# Patient Record
Sex: FEMALE | Employment: UNEMPLOYED | URBAN - METROPOLITAN AREA
[De-identification: names, ages, dates, MRNs, and addresses within clinical notes are randomized per-mention and may not be internally consistent; named-entity substitution may affect disease eponyms.]

---

## 2019-06-10 ENCOUNTER — OFFICE VISIT (OUTPATIENT)
Dept: SURGERY | Facility: CLINIC | Age: 39
End: 2019-06-10
Payer: MEDICAID

## 2019-06-10 VITALS — WEIGHT: 164.81 LBS | BODY MASS INDEX: 23.07 KG/M2 | HEIGHT: 71 IN

## 2019-06-10 DIAGNOSIS — R92.8 ABNORMAL MAMMOGRAM: ICD-10-CM

## 2019-06-10 DIAGNOSIS — N61.1 LEFT BREAST ABSCESS: Primary | ICD-10-CM

## 2019-06-10 PROCEDURE — 99203 PR OFFICE/OUTPT VISIT, NEW, LEVL III, 30-44 MIN: ICD-10-PCS | Mod: S$PBB,25,, | Performed by: SURGERY

## 2019-06-10 PROCEDURE — 76942 PR U/S GUIDANCE FOR NEEDLE GUIDANCE: ICD-10-PCS | Mod: 26,S$GLB,, | Performed by: SURGERY

## 2019-06-10 PROCEDURE — 99203 OFFICE O/P NEW LOW 30 MIN: CPT | Mod: S$PBB,25,, | Performed by: SURGERY

## 2019-06-10 PROCEDURE — 76942 ECHO GUIDE FOR BIOPSY: CPT | Mod: 26,S$GLB,, | Performed by: SURGERY

## 2019-06-10 PROCEDURE — 87075 CULTR BACTERIA EXCEPT BLOOD: CPT

## 2019-06-10 PROCEDURE — 99999 PR PBB SHADOW E&M-EST. PATIENT-LVL II: CPT | Mod: PBBFAC,,, | Performed by: SURGERY

## 2019-06-10 PROCEDURE — 87077 CULTURE AEROBIC IDENTIFY: CPT

## 2019-06-10 PROCEDURE — 87070 CULTURE OTHR SPECIMN AEROBIC: CPT

## 2019-06-10 PROCEDURE — 10061 I&D ABSCESS COMP/MULTIPLE: CPT | Mod: PBBFAC,PO | Performed by: SURGERY

## 2019-06-10 PROCEDURE — 10160 PR PUNCTURE DRAINAGE, LESION: ICD-10-PCS | Mod: S$PBB,,, | Performed by: SURGERY

## 2019-06-10 PROCEDURE — 99212 OFFICE O/P EST SF 10 MIN: CPT | Mod: PBBFAC,PO | Performed by: SURGERY

## 2019-06-10 PROCEDURE — 87185 SC STD ENZYME DETCJ PER NZM: CPT

## 2019-06-10 PROCEDURE — 99999 PR PBB SHADOW E&M-EST. PATIENT-LVL II: ICD-10-PCS | Mod: PBBFAC,,, | Performed by: SURGERY

## 2019-06-10 PROCEDURE — 10160 PNXR ASPIR ABSC HMTMA BULLA: CPT | Mod: S$PBB,,, | Performed by: SURGERY

## 2019-06-10 RX ORDER — SULFAMETHOXAZOLE AND TRIMETHOPRIM 800; 160 MG/1; MG/1
1 TABLET ORAL 2 TIMES DAILY
Qty: 20 TABLET | Refills: 0 | Status: SHIPPED | OUTPATIENT
Start: 2019-06-10 | End: 2019-06-20

## 2019-06-10 NOTE — LETTER
Amisha 10, 2019      Erica Lezama MD  [address]  [facility]  [place] [address]           [facility] - Breast Surgery  [address] [facility] [address]  [place] [address]  Phone: [phone]  Fax: [phone]          Patient: Ronnie Kuhn   MR Number: [id number]   YOB: 1980   Date of Visit: 6/10/2019       Dear Dr. Erica Lezama:    Thank you for referring Ronnie Kuhn to me for evaluation. Attached you will find relevant portions of my assessment and plan of care.    If you have questions, please do not hesitate to call me. I look forward to following Ronnie Kuhn along with you.    Sincerely,    Jacqueline Law MD      Enclosure  CC:  No Recipients    If you would like to receive this communication electronically, please contact [email or web address] or [phone] to request more information on [facility] access.    For providers and/or their staff who would like to refer a patient to [facility], please contact us through our one-stop-shop provider referral line, [facility], at [phone].    If you feel you have received this communication in error or would no longer like to receive these types of communications, please e-mail [email or web address]

## 2019-06-10 NOTE — PROGRESS NOTES
Breast Surgery  [facility]  Department of Surgery      REFERRING PROVIDER: Erica Lezama MD  [address] [place] [address]    Chief Complaint: Consult (abn mammo recommending bx)      Subjective:      Patient ID: Ronnie Kuhn is a 38 y.o. female who presents with abnormal mammogram, suspicious today for breast abscess.  Biopsy was recommended for BIRADS IV mammogram.  She reports having a nipple piercing about 1 year ago and had several incidences of trauma to the nipple.  She reports then feeling a mass of the lower areola about 3 weeks ago and removed the piercing.  A mammogram was performed, which initiated discharge that was thick and white from the piercing tract on the nipple.  SHe has discharge daily since the mammogram.  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      GYN History:  Age of menarche was 15. Premenopausal with regular periods and no OCPs..  Patient is . Age of first live birth was 25. Patient did breast feed.    History reviewed. No pertinent past medical history.  History reviewed. No pertinent surgical history.  No current outpatient medications on file prior to visit.     No current facility-administered medications on file prior to visit.      Social History     Socioeconomic History    Marital status: Single     Spouse name: Not on file    Number of children: Not on file    Years of education: Not on file    Highest education level: Not on file   Occupational History    Not on file   Social Needs    Financial resource strain: Not on file    Food insecurity:     Worry: Not on file     Inability: Not on file    Transportation needs:     Medical: Not on file     Non-medical: Not on file   Tobacco Use    Smoking status: Current Some Day Smoker     Types: Cigarettes   Substance and Sexual Activity    Alcohol use: Yes     Comment: Multiple times per week    Drug use: Yes  "    Types: Marijuana     Comment: daily    Sexual activity: Not on file   Lifestyle    Physical activity:     Days per week: Not on file     Minutes per session: Not on file    Stress: Not on file   Relationships    Social connections:     Talks on phone: Not on file     Gets together: Not on file     Attends Alevism service: Not on file     Active member of club or organization: Not on file     Attends meetings of clubs or organizations: Not on file     Relationship status: Not on file   Other Topics Concern    Not on file   Social History Narrative    Not on file     Family History   Problem Relation Age of Onset    Cancer Maternal Aunt         leukemia        Review of Systems   Constitutional: Negative for appetite change, chills, fever and unexpected weight change.   HENT: Negative for facial swelling, postnasal drip and sore throat.    Eyes: Negative for redness and itching.   Respiratory: Negative for chest tightness and shortness of breath.    Cardiovascular: Negative for chest pain and palpitations.   Gastrointestinal: Negative for blood in stool, diarrhea, nausea and vomiting.   Genitourinary: Negative for difficulty urinating and dysuria.   Musculoskeletal: Negative for arthralgias and joint swelling.   Skin: Negative for rash and wound.   Neurological: Negative for dizziness and syncope.   Hematological: Negative for adenopathy.   Psychiatric/Behavioral: Negative for agitation. The patient is not nervous/anxious.      Objective:   Ht 5' 11" (1.803 m)   Wt 74.7 kg (164 lb 12.7 oz)   BMI 22.98 kg/m²     Physical Exam   Constitutional: She appears well-developed and well-nourished.   HENT:   Head: Normocephalic.   Eyes: No scleral icterus.   Neck: Neck supple. No tracheal deviation present.   Cardiovascular: Normal rate and regular rhythm.    Pulmonary/Chest: Breath sounds normal. No respiratory distress. Right breast exhibits no inverted nipple, no mass, no nipple discharge and no skin change. " Left breast exhibits skin change. Left breast exhibits no inverted nipple, no mass and no nipple discharge.       Abdominal: Soft. She exhibits no mass. There is no tenderness.   Musculoskeletal: She exhibits no edema.   Lymphadenopathy:     She has no cervical adenopathy.   Neurological: She is alert.   Skin: No rash noted. No erythema.     Psychiatric: She has a normal mood and affect.       Radiology review: Images personally reviewed by me in the clinic.   Mammogram:Findings:  This procedure was performed using tomosynthesis.  Computer-aided detection was utilized in the interpretation of this examination.  The breasts are almost entirely fatty.      Left  Mammo Digital Diagnostic Bilat w/ Terry  There is a 30 mm mass seen in the retroareolar region of the left breast.      US Breast Left Limited  There is a 30 mm mass seen in the retroareolar region of the left breast.      Right  Mammo Digital Diagnostic Bilat w/ Terry  There is no evidence of suspicious masses, calcifications, or other abnormal findings.     Impression:  Left  Mass: Left breast 30 mm mass at the retroareolar position. Assessment: 4 - Suspicious finding. Biopsy is recommended.      Right  There is no mammographic or sonographic evidence of malignancy.     BI-RADS Category:   Overall: 4 - Suspicious     Recommendation:  Biopsy is recommended.     The patient's estimated lifetime risk of breast cancer (to age 85) based on Tyrer-Cuzick - 7 risk assessment model is: Tyrer-Cuzick: 12.02 %. According to the American Cancer Society,  patients with a lifetime breast cancer risk of 20% or higher might benefit from supplemental screening tests.    Assessment:       1. Left breast abscess    2. Abnormal mammogram        Plan:       We discussed typical protocol for biopsy to be performed following abnormal mammogram, but on exam, the suspected mass seemed more like an abscess.  US was performed and identified a fluid collection in the inferior subareolar  space at the dermis.  We then elected to perform an aspiration of the fluid and culture.  Antibiotics were also recommended.  10 Day course of Bactrim DS sent to pharmacy.    I explained that even if this fully resolves, I would recommend a follow up MMG in 6-8weeks to assure complete resolution with no residual mass as the etiology of the abscess.    I did tell her that she may still need a biopsy but that we will re-evaluate her in 2 weeks to see if there is full resolution.  I will reach out to radiology to discuss.   All her questions were answered.    Total time spent with the patient: 30 minutes.  20 minutes of face to face consultation and 10 minutes of chart review and coordination of care.    PROCEDURE:  Ultrasound performed to identify fluid collection.  Alcohol prep used.  1% lidocaine injected.  18g needle used to aspirate 4cc bloody purulent thick fluid.  Most of the cavity collapsed on US.  Pressure dressing placed.  Fluid sent for culture.

## 2019-06-12 LAB — BACTERIA SPEC AEROBE CULT: NORMAL

## 2019-06-15 LAB — BACTERIA SPEC ANAEROBE CULT: NORMAL

## 2019-06-24 ENCOUNTER — OFFICE VISIT (OUTPATIENT)
Dept: SURGERY | Facility: CLINIC | Age: 39
End: 2019-06-24
Payer: MEDICAID

## 2019-06-24 VITALS — SYSTOLIC BLOOD PRESSURE: 102 MMHG | DIASTOLIC BLOOD PRESSURE: 78 MMHG

## 2019-06-24 DIAGNOSIS — N61.1 LEFT BREAST ABSCESS: Primary | ICD-10-CM

## 2019-06-24 PROCEDURE — 99212 OFFICE O/P EST SF 10 MIN: CPT | Mod: PBBFAC,PO | Performed by: SURGERY

## 2019-06-24 PROCEDURE — 99212 OFFICE O/P EST SF 10 MIN: CPT | Mod: S$PBB,,, | Performed by: SURGERY

## 2019-06-24 PROCEDURE — 99212 PR OFFICE/OUTPT VISIT, EST, LEVL II, 10-19 MIN: ICD-10-PCS | Mod: S$PBB,,, | Performed by: SURGERY

## 2019-06-24 PROCEDURE — 99999 PR PBB SHADOW E&M-EST. PATIENT-LVL II: CPT | Mod: PBBFAC,,, | Performed by: SURGERY

## 2019-06-24 PROCEDURE — 99999 PR PBB SHADOW E&M-EST. PATIENT-LVL II: ICD-10-PCS | Mod: PBBFAC,,, | Performed by: SURGERY

## 2019-06-24 NOTE — PROGRESS NOTES
Breast Surgery  [facility]  Department of Surgery      REFERRING PROVIDER: No referring provider defined for this encounter.    Chief Complaint: Follow-up (breast abscess)      Subjective:      Patient ID: Ronnie Kuhn is a 38 y.o. female who presents with abnormal mammogram, suspicious today for breast abscess.  Biopsy was recommended for BIRADS IV mammogram.  She reports having a nipple piercing about 1 year ago and had several incidences of trauma to the nipple.  She reports then feeling a mass of the lower areola about 3 weeks ago and removed the piercing.  A mammogram was performed, which initiated discharge that was thick and white from the piercing tract on the nipple.  SHe has discharge daily since the mammogram.  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      GYN History:  Age of menarche was 15. Premenopausal with regular periods and no OCPs..  Patient is . Age of first live birth was 25. Patient did breast feed.    History reviewed. No pertinent past medical history.  History reviewed. No pertinent surgical history.  No current outpatient medications on file prior to visit.     No current facility-administered medications on file prior to visit.      Social History     Socioeconomic History    Marital status: Single     Spouse name: Not on file    Number of children: Not on file    Years of education: Not on file    Highest education level: Not on file   Occupational History    Not on file   Social Needs    Financial resource strain: Not on file    Food insecurity:     Worry: Not on file     Inability: Not on file    Transportation needs:     Medical: Not on file     Non-medical: Not on file   Tobacco Use    Smoking status: Current Some Day Smoker     Types: Cigarettes   Substance and Sexual Activity    Alcohol use: Yes     Comment: Multiple times per week    Drug use: Yes     Types: Marijuana     Comment: daily    Sexual  activity: Not on file   Lifestyle    Physical activity:     Days per week: Not on file     Minutes per session: Not on file    Stress: Not on file   Relationships    Social connections:     Talks on phone: Not on file     Gets together: Not on file     Attends [employer] service: Not on file     Active member of club or organization: Not on file     Attends meetings of clubs or organizations: Not on file     Relationship status: Not on file   Other Topics Concern    Not on file   Social History Narrative    Not on file     Family History   Problem Relation Age of Onset    Cancer Maternal Aunt         leukemia        Review of Systems   Constitutional: Negative for appetite change, chills, fever and unexpected weight change.   HENT: Negative for facial swelling, postnasal drip and sore throat.    Eyes: Negative for redness and itching.   Respiratory: Negative for chest tightness and shortness of breath.    Cardiovascular: Negative for chest pain and palpitations.   Gastrointestinal: Negative for blood in stool, diarrhea, nausea and vomiting.   Genitourinary: Negative for difficulty urinating and dysuria.   Musculoskeletal: Negative for arthralgias and joint swelling.   Skin: Negative for rash and wound.   Neurological: Negative for dizziness and syncope.   Hematological: Negative for adenopathy.   Psychiatric/Behavioral: Negative for agitation. The patient is not nervous/anxious.      Objective:   /78 (BP Location: Right arm, Patient Position: Sitting, BP Method: Large (Manual))     Physical Exam   Constitutional: She appears well-developed and well-nourished.   HENT:   Head: Normocephalic.   Eyes: No scleral icterus.   Neck: Neck supple. No tracheal deviation present.   Cardiovascular: Normal rate and regular rhythm.    Pulmonary/Chest: Breath sounds normal. No respiratory distress. Right breast exhibits no inverted nipple, no mass, no nipple discharge and no skin change. Left breast exhibits skin change.  Left breast exhibits no inverted nipple, no mass and no nipple discharge.       Abdominal: Soft. She exhibits no mass. There is no tenderness.   Musculoskeletal: She exhibits no edema.   Lymphadenopathy:     She has no cervical adenopathy.   Neurological: She is alert.   Skin: No rash noted. No erythema.     Psychiatric: She has a normal mood and affect.       Radiology review: Images personally reviewed by me in the clinic.   Mammogram:Findings:  This procedure was performed using tomosynthesis.  Computer-aided detection was utilized in the interpretation of this examination.  The breasts are almost entirely fatty.      Left  Mammo Digital Diagnostic Bilat w/ Terry  There is a 30 mm mass seen in the retroareolar region of the left breast.      US Breast Left Limited  There is a 30 mm mass seen in the retroareolar region of the left breast.      Right  Mammo Digital Diagnostic Bilat w/ Terry  There is no evidence of suspicious masses, calcifications, or other abnormal findings.     Impression:  Left  Mass: Left breast 30 mm mass at the retroareolar position. Assessment: 4 - Suspicious finding. Biopsy is recommended.      Right  There is no mammographic or sonographic evidence of malignancy.     BI-RADS Category:   Overall: 4 - Suspicious     Recommendation:  Biopsy is recommended.     The patient's estimated lifetime risk of breast cancer (to age 85) based on Tyrer-Cuzick - 7 risk assessment model is: Tyrer-Cuzick: 12.02 %. According to the American Cancer Society,  patients with a lifetime breast cancer risk of 20% or higher might benefit from supplemental screening tests.    Assessment:       Breast abscess   Plan:     She is much improved, almost completely resolved.  Will have her return in 1 month with mammogram to assess for complete resolution vs need for biopsy.

## 2019-07-01 DIAGNOSIS — N61.1 LEFT BREAST ABSCESS: Primary | ICD-10-CM

## 2019-07-02 DIAGNOSIS — N61.1 ABSCESS OF LEFT BREAST: Primary | ICD-10-CM

## 2019-07-22 ENCOUNTER — HOSPITAL ENCOUNTER (OUTPATIENT)
Dept: RADIOLOGY | Facility: HOSPITAL | Age: 39
Discharge: HOME | End: 2019-07-22
Attending: SURGERY
Payer: MEDICAID

## 2019-07-22 ENCOUNTER — OFFICE VISIT (OUTPATIENT)
Dept: SURGERY | Facility: CLINIC | Age: 39
End: 2019-07-22
Payer: MEDICAID

## 2019-07-22 VITALS — DIASTOLIC BLOOD PRESSURE: 87 MMHG | SYSTOLIC BLOOD PRESSURE: 134 MMHG | HEART RATE: 71 BPM

## 2019-07-22 DIAGNOSIS — N61.1 ABSCESS OF LEFT BREAST: ICD-10-CM

## 2019-07-22 DIAGNOSIS — N61.1 BREAST ABSCESS: Primary | ICD-10-CM

## 2019-07-22 DIAGNOSIS — N61.1 LEFT BREAST ABSCESS: ICD-10-CM

## 2019-07-22 PROCEDURE — 77061 BREAST TOMOSYNTHESIS UNI: CPT | Mod: 26,LT,, | Performed by: RADIOLOGY

## 2019-07-22 PROCEDURE — 76642 US BREAST LEFT LIMITED: ICD-10-PCS | Mod: 26,LT,, | Performed by: RADIOLOGY

## 2019-07-22 PROCEDURE — 77065 MAMMO DIGITAL DIAGNOSTIC LEFT WITH TOMOSYNTHESIS_CAD: ICD-10-PCS | Mod: 26,LT,, | Performed by: RADIOLOGY

## 2019-07-22 PROCEDURE — 99212 PR OFFICE/OUTPT VISIT, EST, LEVL II, 10-19 MIN: ICD-10-PCS | Mod: S$PBB,,, | Performed by: SURGERY

## 2019-07-22 PROCEDURE — 99212 OFFICE O/P EST SF 10 MIN: CPT | Mod: PBBFAC,25,PO | Performed by: SURGERY

## 2019-07-22 PROCEDURE — 77065 DX MAMMO INCL CAD UNI: CPT | Mod: TC,LT

## 2019-07-22 PROCEDURE — 76642 ULTRASOUND BREAST LIMITED: CPT | Mod: TC,LT

## 2019-07-22 PROCEDURE — 76642 ULTRASOUND BREAST LIMITED: CPT | Mod: 26,LT,, | Performed by: RADIOLOGY

## 2019-07-22 PROCEDURE — 77061 MAMMO DIGITAL DIAGNOSTIC LEFT WITH TOMOSYNTHESIS_CAD: ICD-10-PCS | Mod: 26,LT,, | Performed by: RADIOLOGY

## 2019-07-22 PROCEDURE — 77065 DX MAMMO INCL CAD UNI: CPT | Mod: 26,LT,, | Performed by: RADIOLOGY

## 2019-07-22 PROCEDURE — 99999 PR PBB SHADOW E&M-EST. PATIENT-LVL II: ICD-10-PCS | Mod: PBBFAC,,, | Performed by: SURGERY

## 2019-07-22 PROCEDURE — 99212 OFFICE O/P EST SF 10 MIN: CPT | Mod: S$PBB,,, | Performed by: SURGERY

## 2019-07-22 PROCEDURE — 77061 BREAST TOMOSYNTHESIS UNI: CPT | Mod: TC,LT

## 2019-07-22 PROCEDURE — 99999 PR PBB SHADOW E&M-EST. PATIENT-LVL II: CPT | Mod: PBBFAC,,, | Performed by: SURGERY

## 2019-07-22 NOTE — PROGRESS NOTES
Breast Surgery  [facility]  Department of Surgery      REFERRING PROVIDER: No referring provider defined for this encounter.    Chief Complaint: breast abscess    Subjective:      Patient ID: Ronnie Kuhn is a 38 y.o. female who returns for follow up of abscess with imaging.  There has not been complete resolution but near complete.  No longer painful.  Had a recent episode of a very scant amount of clear discharge. Her imaging today shows improvement and recommends return to annual mammogram schedule at age 42.    She initially presented with abnormal mammogram, suspicious today for breast abscess.  Biopsy was recommended for BIRADS IV mammogram.  She reports having a nipple piercing about 1 year ago and had several incidences of trauma to the nipple.  She reports then feeling a mass of the lower areola about 3 weeks ago and removed the piercing.  A mammogram was performed, which initiated discharge that was thick and white from the piercing tract on the nipple.  SHe has discharge daily since the mammogram.  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      GYN History:  Age of menarche was 15. Premenopausal with regular periods and no OCPs..  Patient is . Age of first live birth was 25. Patient did breast feed.    No past medical history on file.  No past surgical history on file.  No current outpatient medications on file prior to visit.     No current facility-administered medications on file prior to visit.      Social History     Socioeconomic History    Marital status: Single     Spouse name: Not on file    Number of children: Not on file    Years of education: Not on file    Highest education level: Not on file   Occupational History    Not on file   Social Needs    Financial resource strain: Not on file    Food insecurity:     Worry: Not on file     Inability: Not on file    Transportation needs:     Medical: Not on file      Non-medical: Not on file   Tobacco Use    Smoking status: Current Some Day Smoker     Types: Cigarettes   Substance and Sexual Activity    Alcohol use: Yes     Comment: Multiple times per week    Drug use: Yes     Types: Marijuana     Comment: daily    Sexual activity: Not on file   Lifestyle    Physical activity:     Days per week: Not on file     Minutes per session: Not on file    Stress: Not on file   Relationships    Social connections:     Talks on phone: Not on file     Gets together: Not on file     Attends Mandaen service: Not on file     Active member of club or organization: Not on file     Attends meetings of clubs or organizations: Not on file     Relationship status: Not on file   Other Topics Concern    Not on file   Social History Narrative    Not on file     Family History   Problem Relation Age of Onset    Cancer Maternal Aunt         leukemia        Review of Systems   Constitutional: Negative for appetite change, chills, fever and unexpected weight change.   HENT: Negative for facial swelling, postnasal drip and sore throat.    Eyes: Negative for redness and itching.   Respiratory: Negative for chest tightness and shortness of breath.    Cardiovascular: Negative for chest pain and palpitations.   Gastrointestinal: Negative for blood in stool, diarrhea, nausea and vomiting.   Genitourinary: Negative for difficulty urinating and dysuria.   Musculoskeletal: Negative for arthralgias and joint swelling.   Skin: Negative for rash and wound.   Neurological: Negative for dizziness and syncope.   Hematological: Negative for adenopathy.   Psychiatric/Behavioral: Negative for agitation. The patient is not nervous/anxious.      Objective:   There were no vitals taken for this visit.    Physical Exam   Constitutional: She appears well-developed and well-nourished.   HENT:   Head: Normocephalic.   Eyes: No scleral icterus.   Neck: Neck supple. No tracheal deviation present.   Cardiovascular: Normal  rate and regular rhythm.    Pulmonary/Chest: Breath sounds normal. No respiratory distress. Right breast exhibits no inverted nipple, no mass, no nipple discharge and no skin change. Left breast exhibits skin change. Left breast exhibits no inverted nipple, no mass and no nipple discharge.   Abdominal: Soft. She exhibits no mass. There is no tenderness.   Musculoskeletal: She exhibits no edema.   Lymphadenopathy:     She has no cervical adenopathy.   Neurological: She is alert.   Skin: No rash noted. No erythema.     Psychiatric: She has a normal mood and affect.       Radiology review: Images personally reviewed by me in the clinic.   Mammogram:Findings:  This procedure was performed using tomosynthesis.  Computer-aided detection was utilized in the interpretation of this examination.  The breasts are almost entirely fatty.      Left  Mammo Digital Diagnostic Bilat w/ Terry  There is a 30 mm mass seen in the retroareolar region of the left breast.      US Breast Left Limited  There is a 30 mm mass seen in the retroareolar region of the left breast.      Right  Mammo Digital Diagnostic Bilat w/ Terry  There is no evidence of suspicious masses, calcifications, or other abnormal findings.     Impression:  Left  Mass: Left breast 30 mm mass at the retroareolar position. Assessment: 4 - Suspicious finding. Biopsy is recommended.      Right  There is no mammographic or sonographic evidence of malignancy.     BI-RADS Category:   Overall: 4 - Suspicious     Recommendation:  Biopsy is recommended.     The patient's estimated lifetime risk of breast cancer (to age 85) based on Tyrer-Cuzick - 7 risk assessment model is: Tyrer-Cuzick: 12.02 %. According to the American Cancer Society,  patients with a lifetime breast cancer risk of 20% or higher might benefit from supplemental screening tests.        Assessment:       Breast abscess   Plan:     She is much improved, almost completely resolved.  Will have her return in 6 months  to assess for complete resolution.  If there is any concern on exam at that time or prior to scheduled visit, she should let us know.

## 2019-08-04 ENCOUNTER — HOSPITAL ENCOUNTER (EMERGENCY)
Facility: HOSPITAL | Age: 39
Discharge: HOME | End: 2019-08-05
Attending: EMERGENCY MEDICINE
Payer: MEDICAID

## 2019-08-04 VITALS
HEIGHT: 71 IN | HEART RATE: 62 BPM | BODY MASS INDEX: 22.96 KG/M2 | DIASTOLIC BLOOD PRESSURE: 83 MMHG | RESPIRATION RATE: 16 BRPM | SYSTOLIC BLOOD PRESSURE: 139 MMHG | OXYGEN SATURATION: 98 % | TEMPERATURE: 99 F | WEIGHT: 164 LBS

## 2019-08-04 DIAGNOSIS — J02.9 SORE THROAT: ICD-10-CM

## 2019-08-04 DIAGNOSIS — J36 PERITONSILLAR ABSCESS: Primary | ICD-10-CM

## 2019-08-04 PROCEDURE — 87070 CULTURE OTHR SPECIMN AEROBIC: CPT

## 2019-08-04 PROCEDURE — 99283 EMERGENCY DEPT VISIT LOW MDM: CPT

## 2019-08-04 PROCEDURE — 87147 CULTURE TYPE IMMUNOLOGIC: CPT

## 2019-08-04 PROCEDURE — 99285 EMERGENCY DEPT VISIT HI MDM: CPT | Mod: ,,, | Performed by: PHYSICIAN ASSISTANT

## 2019-08-04 PROCEDURE — 87075 CULTR BACTERIA EXCEPT BLOOD: CPT

## 2019-08-04 PROCEDURE — 99285 PR EMERGENCY DEPT VISIT,LEVEL V: ICD-10-PCS | Mod: ,,, | Performed by: PHYSICIAN ASSISTANT

## 2019-08-04 RX ORDER — METHYLPREDNISOLONE 4 MG/1
TABLET ORAL
Qty: 1 PACKAGE | Refills: 0 | Status: SHIPPED | OUTPATIENT
Start: 2019-08-04 | End: 2019-08-25

## 2019-08-04 RX ORDER — CLINDAMYCIN HYDROCHLORIDE 150 MG/1
450 CAPSULE ORAL 3 TIMES DAILY
Qty: 90 CAPSULE | Refills: 0 | Status: SHIPPED | OUTPATIENT
Start: 2019-08-04 | End: 2019-08-14

## 2019-08-04 RX ORDER — NAPROXEN 500 MG/1
500 TABLET ORAL 2 TIMES DAILY WITH MEALS
Qty: 20 TABLET | Refills: 0 | Status: SHIPPED | OUTPATIENT
Start: 2019-08-04 | End: 2020-10-12 | Stop reason: SDUPTHER

## 2019-08-04 RX ORDER — HYDROCODONE BITARTRATE AND ACETAMINOPHEN 5; 325 MG/1; MG/1
1 TABLET ORAL EVERY 4 HOURS PRN
Qty: 11 TABLET | Refills: 0 | Status: SHIPPED | OUTPATIENT
Start: 2019-08-04 | End: 2019-08-07

## 2019-08-05 PROCEDURE — 25000003 PHARM REV CODE 250: Performed by: PHYSICIAN ASSISTANT

## 2019-08-05 RX ORDER — HYDROCODONE BITARTRATE AND ACETAMINOPHEN 5; 325 MG/1; MG/1
1 TABLET ORAL
Status: COMPLETED | OUTPATIENT
Start: 2019-08-05 | End: 2019-08-05

## 2019-08-05 RX ADMIN — HYDROCODONE BITARTRATE AND ACETAMINOPHEN 1 TABLET: 5; 325 TABLET ORAL at 12:08

## 2019-08-05 NOTE — ED TRIAGE NOTES
PT brought via [facility] as tranfer for EENT consult for PTA. Pt reports pain to left side of throat.     Patient identifiers verified and correct for Jaida Kuhn.     LOC: The patient is awake, alert and aware of environment with an appropriate affect, the patient is oriented x 3 and speaking appropriately.   APPEARANCE: Patient appears comfortable and in no acute distress, patient is clean and well groomed.  SKIN: The skin is warm and dry, color consistent with ethnicity, patient has normal skin turgor and moist mucus membranes, skin intact, no breakdown or bruising noted.   MUSCULOSKELETAL: Patient moving all extremities spontaneously, no swelling noted.  RESPIRATORY: Airway is open and patent, respirations are spontaneous, patient has a normal effort and rate, no accessory muscle use noted. PTA to left side.   CARDIAC: Patient has a normal rate and regular rhythm, no edema noted, capillary refill < 3 seconds.   GASTRO: Soft and non tender to palpation, no distention noted.   : Pt denies any pain or frequency with urination.  NEURO: Pt opens eyes spontaneously, behavior appropriate to situation, follows commands, facial expression symmetrical, bilateral hand grasp equal and even, purposeful motor response noted, normal sensation in all extremities when touched with a finger.

## 2019-08-05 NOTE — DISCHARGE INSTRUCTIONS
Take the prescribed Clindamycin and Medrol dose pack for ongoing management of your sore throat and abscess  Take Naprosyn as directed for your pain and use the prescribed Norco for breakthrough pain  Return to the emergency room for new, worsening, or concerning symptoms    Our goal in the emergency department is to always give you outstanding care and exceptional service. You may receive a survey by mail or e-mail in the next week regarding your experience in our ED. We would greatly appreciate your completing and returning the survey. Your feedback provides us with a way to recognize our staff who give very good care and it helps us learn how to improve when your experience was below our aspiration of excellence.

## 2019-08-05 NOTE — CONSULTS
Otolaryngology - Head and Neck Surgery  Consultation Report      Consultation from: ER  Reason for Consultation:  PTA      Subjective:      CC:   Chief Complaint   Patient presents with    Peritonsillar abccess     PT brought via [facility] as tranfer for EENT consult for PTA       HPI     38F presents with throat pain. Pain has been intermittent over the past 3 weeks, worse and more constant over the past 3 days. Reports muffled voice, painful swallowing and left ear pain. Denies difficulty breathing, fevers, chills, nausea, vomiting. Reports adequate PO intake. Has not received any treatment for her throat pain prior to today. She denies prior history of frequent sore throat, PTA, or tonsillitis.       ROS: ENT-focused ROS completed and is negative unless otherwise stated in the HPI.     Past Medical History:   Diagnosis Date    Anemia      History reviewed. No pertinent surgical history.  Social History     Tobacco Use    Smoking status: Current Some Day Smoker     Types: Cigarettes   Substance Use Topics    Alcohol use: Yes     Comment: Multiple times per week     Family History   Problem Relation Age of Onset    Cancer Maternal Aunt         leukemia     No current facility-administered medications for this encounter.   No current outpatient medications on file.  Patient has no known allergies.      Objective:     Temp:  [98.7 °F (37.1 °C)-98.8 °F (37.1 °C)] 98.7 °F (37.1 °C)  Pulse:  [60-79] 62  Resp:  [15-18] 16  SpO2:  [98 %-100 %] 98 %  BP: (135-146)/(83-99) 139/83  Awake, Alert and Oriented. NAD. Appropriate affect and appearance   Spontaneous eye opening, appropriate verbal responses, follows commands  Normocephalic, atraumatic, No facial deformities, skin is intact and non-erythematous  Pupils equal, round & brisk. EOMI, no proptosis, no spontaneous nystagmus  Vision grossly intact, Hearing grossly intact  Periauricular regions non-erythematous, non-fluctuanct non-tender  Pinna normal  bilaterally, no skin lesions  External nose is symmetric, no skin lesions  Tongue midline on extension, non-edematous, soft  No labial, buccal, oral tongue or floor of mouth lesions    Soft palate asymmetric, left palate swollen, erythematous and fluctuant  Uvula is deviated to the right, minimal trismus    Neck is symmetric, non-edematous, non-erythematous  Normal work of breathing, no accessory muscle use, no stridor  Voice is mildly muffled       Recent Labs   Lab 08/04/19  1912   WBC 6.94   HGB 11.1*   HCT 33.8*         K 3.6   BUN 8   CREATININE 0.79   CALCIUM 9.1       Microbiology Results (last 7 days)     ** No results found for the last 168 hours. **          Imaging Results    None       Procedure: Incision and drainage of left peritonsillar abscess  Pre Procedure DX: left peritonsillar abscess  Post-procedure Dx: same  Surgeon: Bhavin mccrary  Anesthesia:  Lidocaine with epinephrine 1:100,000   Consent: Verbal consent was obtained, outlining the risks, benefits, and alternatives.   Procedure in detail: 3cc of 1% Lidocaine with epinephrine 1:100,000 was injected into the left peritonsillar space. Pus immediately started draining from the injection site. An 11 blade knife was then used to make a 1cm incision over this area of fluctuance. The wound was then explored with hemostats. The wound was swabbed and sent for culture.  Findings: several cc of pus   Complications: None; The patient tolerated the procedure well.  Dispo: stable, improved          Assessment/Plan:        Left peritonsillar abscess     - S/p I&D  - Recommend discharge home with 10d clindamycin, medrol dose pack and pain rx.   - Follow up with ENT prn.       Bhavin Mccrary,   Otorhinolaryngology-Head & Neck Surgery  [facility]  08/04/2019

## 2019-08-05 NOTE — ED PROVIDER NOTES
Encounter Date: 8/4/2019       History     Chief Complaint   Patient presents with    Peritonsillar abccess     PT brought via [facility] as tranfer for EENT consult for PTA     38 year old female presenting to the ED with the chief complaint of sore throat. Patient transferred from [facility] ED for ENT evaluation of peritonsillar abscess. Patient reports having intermittent sore throats for the past 3 weeks. She reports the current sore throat has been present for 3 days and progressively worsening. She reports associated voice change, difficulty swallowing, post-nasal drip. She denies fever, chills, drooling, trismus, neck stiffness, cough.         Review of patient's allergies indicates:  No Known Allergies  Past Medical History:   Diagnosis Date    Anemia      History reviewed. No pertinent surgical history.  Family History   Problem Relation Age of Onset    Cancer Maternal Aunt         leukemia     Social History     Tobacco Use    Smoking status: Current Some Day Smoker     Types: Cigarettes   Substance Use Topics    Alcohol use: Yes     Comment: Multiple times per week    Drug use: Yes     Types: Marijuana     Comment: daily     Review of Systems   Constitutional: Negative for chills, diaphoresis and fever.   HENT: Positive for postnasal drip, sore throat, trouble swallowing and voice change. Negative for congestion, facial swelling, rhinorrhea, sinus pressure and sinus pain.    Eyes: Negative for pain and redness.   Respiratory: Negative for cough and shortness of breath.    Cardiovascular: Negative for chest pain.   Gastrointestinal: Negative for abdominal pain, diarrhea, nausea and vomiting.   Genitourinary: Negative for dysuria and hematuria.   Musculoskeletal: Negative for arthralgias, back pain, neck pain and neck stiffness.   Skin: Negative for rash and wound.   Neurological: Negative for light-headedness and headaches.     Physical Exam     Initial Vitals [08/04/19 2140]   BP Pulse Resp Temp SpO2    139/83 62 16 98.7 °F (37.1 °C) 98 %      MAP       --         Physical Exam    Constitutional: She appears well-developed and well-nourished. She is not diaphoretic. No distress.   HENT:   L peritonsillar edema and erythema. No exudate. Uvula deviated to right. No tongue or submandibular edema or tenderness   Eyes: EOM are normal. Pupils are equal, round, and reactive to light.   Neck: Normal range of motion. Neck supple.   Cardiovascular: Normal rate and regular rhythm.   Pulmonary/Chest: Breath sounds normal. No respiratory distress. She has no wheezes.   Abdominal: Soft. She exhibits no distension. There is no tenderness.   Musculoskeletal: Normal range of motion. She exhibits no tenderness.   Lymphadenopathy:     She has cervical adenopathy.   Neurological: She is alert and oriented to person, place, and time. She has normal strength. No cranial nerve deficit or sensory deficit.   Skin: Skin is warm and dry.         ED Course   Procedures  Labs Reviewed   CULTURE, ANAEROBIC   CULTURE, AEROBIC  (SPECIFY SOURCE)          Imaging Results    None          Medical Decision Making:   History:   Old Medical Records: I decided to obtain old medical records.  Old Records Summarized: records from clinic visits and records from previous admission(s).  Clinical Tests:   Lab Tests: Reviewed  Other:   I have discussed this case with another health care provider.       <> Summary of the Discussion: ENT       APC / Resident Notes:   38 year old female presenting to the ED c/o sore throat. Transferred from [facility] ED for ENT evaluation of L peritonsillar abscess. DDx includes but not limited to viral syndrome, streptococcus pharyngitis, influenza, epiglottitis, peritonsillar abscess, retropharyngeal abscess. Patient received Decadron and Clindamycin prior to transfer.    10:29 PM  Discussed with ENT and they will come see the patient    ENT evaluated patient at bedside. Peritonsillar abscess aspirated with cultures obtained.  Patient stable for discharge. RX for Clinda, Medrol dose pack, Naprosyn provided. Short RX for Norco given for breakthrough pain. Advised patient to follow-up with PCP in 1 week for re-evaluation. Patient expresses understanding and agreeable to the plan. Return to ED precautions given for new, worsening, or concerning symptoms. I have discussed the care of this patient with my supervising physician.                  Clinical Impression:       ICD-10-CM ICD-9-CM   1. Peritonsillar abscess J36 475   2. Sore throat J02.9 462         Disposition:   Disposition: Discharged  Condition: Stable                        Abebe Turner PA-C  08/05/19 0015

## 2019-08-07 LAB — BACTERIA SPEC AEROBE CULT: ABNORMAL

## 2019-08-09 LAB — BACTERIA SPEC ANAEROBE CULT: NORMAL
